# Patient Record
Sex: FEMALE | Race: WHITE | NOT HISPANIC OR LATINO | URBAN - METROPOLITAN AREA
[De-identification: names, ages, dates, MRNs, and addresses within clinical notes are randomized per-mention and may not be internally consistent; named-entity substitution may affect disease eponyms.]

---

## 2019-01-31 ENCOUNTER — COMMUNICATION - HEALTHEAST (OUTPATIENT)
Dept: SCHEDULING | Facility: CLINIC | Age: 24
End: 2019-01-31

## 2020-06-10 ENCOUNTER — AMBULATORY - HEALTHEAST (OUTPATIENT)
Dept: INTERNAL MEDICINE | Facility: CLINIC | Age: 25
End: 2020-06-10

## 2020-06-10 DIAGNOSIS — Z20.822 EXPOSURE TO 2019 NOVEL CORONAVIRUS: ICD-10-CM

## 2020-06-15 ENCOUNTER — AMBULATORY - HEALTHEAST (OUTPATIENT)
Dept: FAMILY MEDICINE | Facility: CLINIC | Age: 25
End: 2020-06-15

## 2020-06-15 DIAGNOSIS — Z20.822 EXPOSURE TO 2019 NOVEL CORONAVIRUS: ICD-10-CM

## 2020-06-16 ENCOUNTER — COMMUNICATION - HEALTHEAST (OUTPATIENT)
Dept: FAMILY MEDICINE | Facility: CLINIC | Age: 25
End: 2020-06-16

## 2021-06-20 NOTE — LETTER
Letter by Christina Ding RN at      Author: Christina Ding RN Service: -- Author Type: --    Filed:  Encounter Date: 6/16/2020 Status: (Other)       6/16/2020        Mariposa Perdomo  [address] [facility] [id number]    This letter provides a written record that you were tested for COVID-19 on 6/15/20.     Your result was negative.    This means that we didnt find the virus that causes COVID-19 in your sample. A test may show negative when you do actually have the virus. This can happen when the virus is in the early stages of infection, before you feel illness symptoms.    Even if you dont have symptoms, they may still appear. For safety, its very important to follow these rules.    Keep yourself away from others (self-isolation):      Stay home. Dont go to work, school or anywhere else.     Stay in your own room (and use your own bathroom), if you can.    Stay away from others in your home. No hugging, kissing or shaking hands. No visitors.    Clean high touch surfaces often (doorknobs, counters, handles, etc.). Use a household cleaning spray or wipes.    Cover your mouth and nose with a mask, tissue or washcloth to avoid spreading germs.    Wash your hands and face often with soap and water.    Stay in self-isolation until you meet ALL of the guidelines below:    1. You have had no fever for at least 72 hours (that is 3 full days of no fever without the use of medicine that reduces fevers), AND  2. other symptoms (such as cough, shortness of breath) have gotten better, AND  3. at least 10 days have passed since your symptoms first appeared.    Going back to work  Check with your employer for any guidelines to follow for going back to work.    Employers: This document serves as formal notice that your employee tested negative for COVID-19, as of the testing date shown above.    For questions regarding this letter or your Negative COVID-19 result, call [phone] between 8A to 6:30P (M-F) and 10A to 6:30P  (weekends).

## 2021-06-23 NOTE — TELEPHONE ENCOUNTER
"Triage call: No PCP within [facility] and unable to route message     Patient reports bright red blood in the toilet bowl yesterday (once yesterday) which decreased and is now only present when she wipes with the toilet paper. Patient denies feeling dizzy or light headed. Stools are formed and patient declines constipation or diarrhea. Denies additional symptoms. Patient was recently in [place] in the beginning of the month and reports some diarrhea there but none since she has been home.     Triaged to be seen within 3 days, patient doesn't have a primary within [facility] and recommended to go to the [facility] at [facility]. Reviewed location of [facility] with patient and provided address. Patient states that she will go there as it is more convenient as she starts school again in 3 days.     Bridget Carrizales RN [facility] Triage/Med Refill 1/31/2019 11:20 AM       Reason for Disposition    MILD rectal bleeding (more than just a few drops or streaks)    Answer Assessment - Initial Assessment Questions  1. APPEARANCE of BLOOD: \"What color is it?\" \"Is it passed separately, on the surface of the stool, or mixed in with the stool?\"       Bright red blood, first time it was in the bowl and when she wiped and today was just when she wiped  2. AMOUNT: \"How much blood was passed?\"       First time it looked like she was getting her period. Stool today was jsut when she wiped it was on the toilet paper  3. FREQUENCY: \"How many times has blood been passed with the stools?\"       3 stools   4. ONSET: \"When was the blood first seen in the stools?\" (Days or weeks)       Yesterday was the first time  5. DIARRHEA: \"Is there also some diarrhea?\" If so, ask: \"How many diarrhea stools were passed in past 24 hours?\"       Diarrhea in the beginning of January when she was in [facility] but not since she has been home   6. CONSTIPATION: \"Do you have constipation?\" If so, \"How bad is it?\"      no  7. RECURRENT SYMPTOMS: \"Have you had blood in your " "stools before?\" If so, ask: \"When was the last time?\" and \"What happened that time?\"       No   8. BLOOD THINNERS: \"Do you take any blood thinners?\" (e.g., Coumadin/warfarin, Pradaxa/dabigatran, aspirin)      no  9. OTHER SYMPTOMS: \"Do you have any other symptoms?\"  (e.g., abdominal pain, vomiting, dizziness, fever)      No additional symptoms   10. PREGNANCY: \"Is there any chance you are pregnant?\" \"When was your last menstrual period?\"        no    Protocols used: RECTAL BLEEDING-A-[place]      "

## 2021-06-23 NOTE — TELEPHONE ENCOUNTER
"Mother (Chelita) is the caller, reporting \"bright red blood in daughter's stools, twice yesterday, once milder today.\"  Mother is not yet an authorized representative on pt's chart.  Advised necessary to speak with pt directly.  Mother agrees -> will text pt to expect a call within a few mins ....    Attempted to reach pt directly by phone.  Pt answers call, however cannot hear Triager.  Triager's phone is not on 'mute'; no apparent phone system malfunction; however pt continues unable to hear Triager.  Therefore called back to mother to advise having pt call [facility] herself directly.  Mother agrees to do so.    Will start new Triage Encounter when pt calls herself.    "